# Patient Record
Sex: MALE | Race: WHITE | Employment: OTHER | URBAN - METROPOLITAN AREA
[De-identification: names, ages, dates, MRNs, and addresses within clinical notes are randomized per-mention and may not be internally consistent; named-entity substitution may affect disease eponyms.]

---

## 2017-06-22 NOTE — PATIENT DISCUSSION
The patient was here for a vision care examination. There were no untoward findings noted. Repeat evaluation in one to two years is indicated.

## 2017-10-13 NOTE — PATIENT DISCUSSION
The patient feels that the cataract is significantly impacting daily activities and has elected cataract surgery. The risks, benefits, and alternatives to surgery were discussed. The patient elects to proceed with surgery. Patient happy with CL after cataract surgery BASIC OD GOAL STEVE.

## 2017-10-13 NOTE — PATIENT DISCUSSION
The patient feels that the cataract is significantly impacting daily activities and has elected cataract surgery. The risks, benefits, and alternatives to surgery were discussed. The patient elects to proceed with surgery. R/R after first Silvio 68.

## 2018-09-06 NOTE — PROCEDURE NOTE: CLINICAL
PROCEDURE NOTE: Removal of Conjunctival FB, Embedded #1 Right Upper Lid. Prior to treatment, the risks/benefits/alternatives were discussed. The patient wished to proceed with procedure. Embedded conjunctival FB removed with forcep/needle. Globe and conjunctiva intact. Patient tolerated procedure well. There were no complications. Post procedure instructions given. Amalia Wilder

## 2021-03-20 NOTE — PROCEDURE NOTE: CLINICAL
PROCEDURE NOTE: Removal of Conjunctival FB, Superficial #1 OS. Diagnosis: Dry Eye Syndrome. Anesthesia: Topical. Prior to treatment, the risks/benefits/alternatives were discussed. The patient wished to proceed with procedure. Superficial conjunctival FB removed with forcep/needle. Globe and conjunctiva intact. Patient tolerated procedure well. There were no complications. Post procedure instructions given. Ludy Hadley

## 2022-05-12 ENCOUNTER — ESTABLISHED PATIENT (OUTPATIENT)
Dept: URBAN - METROPOLITAN AREA EYE CENTER 2 | Facility: EYE CENTER | Age: 67
End: 2022-05-12

## 2022-05-12 DIAGNOSIS — B00.52: ICD-10-CM

## 2022-05-12 DIAGNOSIS — H25.13: ICD-10-CM

## 2022-05-12 DIAGNOSIS — H40.051: ICD-10-CM

## 2022-05-12 PROCEDURE — 99213 OFFICE O/P EST LOW 20 MIN: CPT

## 2022-05-12 ASSESSMENT — VISUAL ACUITY
OD_CC: 20/25
OS_CC: 20/25+2
OU_CC: 20/25

## 2022-05-12 ASSESSMENT — TONOMETRY
OD_IOP_MMHG: 24
OS_IOP_MMHG: 18

## 2022-05-12 NOTE — PATIENT DISCUSSION
patient wishes to transfer to [facility] due to proximity and convenience. transfer of care to Dr. Montse Mcintosh.

## 2025-06-28 NOTE — PATIENT DISCUSSION
Discussed condition and exacerbating conditions/situations (e.g., dry/arid environments, overhead fans, air conditioners, side effect of medications). Home